# Patient Record
Sex: MALE | Race: WHITE | NOT HISPANIC OR LATINO | Employment: UNEMPLOYED | URBAN - METROPOLITAN AREA
[De-identification: names, ages, dates, MRNs, and addresses within clinical notes are randomized per-mention and may not be internally consistent; named-entity substitution may affect disease eponyms.]

---

## 2024-11-06 ENCOUNTER — TRANSCRIBE ORDERS (OUTPATIENT)
Dept: PHYSICAL THERAPY | Facility: CLINIC | Age: 19
End: 2024-11-06
Payer: OTHER GOVERNMENT

## 2024-11-06 DIAGNOSIS — S66.123D LACERATION OF FLEXOR MUSCLE, FASCIA AND TENDON OF LEFT MIDDLE FINGER AT WRIST AND HAND LEVEL, SUBSEQUENT ENCOUNTER: Primary | ICD-10-CM

## 2024-11-06 DIAGNOSIS — M25.642 STIFFNESS OF LEFT HAND JOINT: ICD-10-CM

## 2024-11-06 DIAGNOSIS — S64.493D INJURY OF DIGITAL NERVE OF LEFT MIDDLE FINGER, SUBSEQUENT ENCOUNTER: ICD-10-CM

## 2024-11-12 ENCOUNTER — TREATMENT (OUTPATIENT)
Dept: PHYSICAL THERAPY | Facility: CLINIC | Age: 19
End: 2024-11-12
Payer: OTHER GOVERNMENT

## 2024-11-12 DIAGNOSIS — S64.493D INJURY OF DIGITAL NERVE OF LEFT MIDDLE FINGER, SUBSEQUENT ENCOUNTER: ICD-10-CM

## 2024-11-12 DIAGNOSIS — M25.642 STIFFNESS OF FINGER JOINT OF LEFT HAND: ICD-10-CM

## 2024-11-12 DIAGNOSIS — S66.123D LACERATION OF FLEXOR MUSCLE, FASCIA AND TENDON OF LEFT MIDDLE FINGER AT WRIST AND HAND LEVEL, SUBSEQUENT ENCOUNTER: Primary | ICD-10-CM

## 2024-11-12 NOTE — PROGRESS NOTES
Outpatient Occupational Therapy Ortho Initial Evaluation                                 [address] [place] [address]    Patient: Teo Billings   : 2005  Diagnosis/ICD-10 Code:  Laceration of flexor muscle, fascia and tendon of left middle finger at wrist and hand level, subsequent encounter [S66.123D]  Referring practitioner: Jj Barber MD  Date of Initial Visit: 2024  Today's Date: 2024  Patient seen for 1 sessions               Subjective Questionnaire: QuickDASH:         Subjective Evaluation    History of Present Illness  Date of surgery: 10/27/2024  Mechanism of injury: Pt reports getting his L middle finger cut by circular saw on 10/26/24. Pt had surgery same night finishing next morning. Pt is in dorsal block splint with ace wrap from forearm to finger tips. Pt reports not really any pain at this point. Pt referred to Occupational therapy for evaluation and treatment for flexor tendon protocol.      Patient Occupation: student Quality of life: good    Pain  Current pain ratin  Location: L hand  Relieving factors: medications (PRN)    Social Support  Lives with: parents    Hand dominance: right    Treatments  Previous treatment: immobilization  Patient Goals  Patient goals for therapy: return to sport/leisure activities, increased motion and increased strength  Patient goal: Use his hand again, play video games and fishing         Objective          Observations     Additional Wrist/Hand Observation Details  Pt has healing incision from L volar third MCP to PIP with minimal drainage, healing incision on volar index finger distal phalanx with minimal drainage with no signs of infection.    Tenderness     Additional Tenderness Details  Tenderness to palpation index finger tip and middle finger proximal phalnax    Neurological Testing     Sensation     Wrist/Hand   Left   Diminished: light touch    Active Range of Motion     Additional Active Range of Motion  Details  immobilized  Left middle finger active extension to roof of dorsal blocks lacks 30 degrees at PIP joint    Passive Range of Motion     Additional Passive Range of Motion Details  Loose passive flexion of left middle finger with proximal phalanx swelling     Strength/Myotome Testing     Additional Strength Details  deferred    Swelling     Left Wrist/Hand     Additional Swelling Details  Moderate in middle finger                   Splinting:  Patient was measure and fit with a custom fabricated WHFO (dorsal blocking orthotic)   Patient was instructed in wearing schedule, precautions and care of the splint during this visit.   Patient was instructed in proper donning/doffing of splint.   Assessment:  Patient was fitted and appropriate splint was fabricated this date.  Patient reported that splint was comfortable and had no complications with the fit of the splint.  Patient was instructed and patient verbalized understanding of precautions, wear and care of the splint.   Patient demonstrated independent donning/doffing of splint during treatment today.  Goals:  Patient was fitted properly with appropriate splint for diagnosis  Patient was educated on precautions, wear schedule and care of splint  Patient demonstrated independence with donning/doffing of the splint.  Splint was provided to Protect Healing Structures, Restrict Mobility, Improve joint alignment.  Plan:  Patient advised to contact therapist with any additional questions or concerns regarding the fit and function of the splint.  Wear Instructions: full time       Assessment & Plan       Assessment  Impairments: abnormal coordination, abnormal or restricted ROM, activity intolerance, impaired physical strength and pain with function   Other impairment: unable fo put hair up, not tying shoes  Functional limitations: carrying objects, lifting, pulling and pushing   Prognosis: good    Goals  Plan Goals: 1. The patient has limited ROM of the L middle  finger                  LTG 1: 12 weeks:  The patient will demonstrate 240 degrees of COLIN L middle finger to allow the patient to weld and tie his shoes.                                  STATUS:  New                   STG 1a: 6 weeks:  The patient will demonstrate 150 COLIN degrees of L middle finger.                                  STATUS:  New                             2. The patient has limited strength of the L hand.                  LTG 2: 12 weeks:  The patient will demonstrate 60 lbs L  strength in order to open jars/bottles.                                  STATUS:  New                  STG 2a:10 weeks:  The patient will demonstrate good tolerance to  strength testing.                                  STATUS:  New  3. Carrying, Moving, and Handling Objects Functional Limitation                                    LTG 3: 12 weeks:  The patient will achieve a score of 11/55 on the Quick DASH.                                  STATUS:  New                  STG 3a: 6 weeks: The patient will achieve a score of 21/55 on the Quick DASH                                  STATUS: New  4. Positioning of L hand for tendon repair                      LTG4: 1 week: The patient will have good orthotic fitting to position hand in protection from flexor tendon repair.                                  STATUS: Met                  Plan  Planned modality interventions: TENS, thermotherapy (hydrocollator packs) and thermotherapy (paraffin bath)  Planned therapy interventions: manual therapy, functional ROM exercises, fine motor coordination training, flexibility, stretching, strengthening, home exercise program, neuromuscular re-education, soft tissue mobilization, therapeutic activities and orthotic fitting/training  Frequency: 2x week  Duration in weeks: 12  Treatment plan discussed with: patient          ICD-10-CM ICD-9-CM   1. Laceration of flexor muscle, fascia and tendon of left middle finger at wrist and hand level,  subsequent encounter  S66.123D 842.10   2. Injury of digital nerve of left middle finger, subsequent encounter  S64.493D V58.89     955.6   3. Stiffness of finger joint of left hand  M25.231 719.54       Patient is indicated for skilled occupational therapy services.    History # of Personal Factors and/or Comorbidities: LOW (0)  Examination of Body System(s): # of elements: MODERATE (3)  Clinical Presentation: EVOLVING  Clinical Decision Making: MODERATE     Evaluation:  Low Complexity:    0     mins  01422;  Mod Complexity:    30     mins  59855;  High Complexity:    0     mins  64103;      Untimed:  WHFO:    40     mins  L 3808      Timed Treatment:   0   mins   Total Treatment:     70   mins      OT SIGNATURE: REMA Haywood/JAYNE    Electronically signed   License number [id number]   DATE TREATMENT INITIATED: 11/12/2024    Initial Certification  Certification Period: 11/12/2024 thru 2/9/2025  I certify that the therapy services are furnished while this patient is under my care.  The services outlined above are required by this patient, and will be reviewed every 90 days.     PHYSICIAN: Jj Barber MD  NPI: [id number]                                          DATE:     Please sign and return via fax to  [phone]   Thank you, [facility].

## 2024-11-13 ENCOUNTER — TELEPHONE (OUTPATIENT)
Dept: PHYSICAL THERAPY | Facility: CLINIC | Age: 19
End: 2024-11-13
Payer: OTHER GOVERNMENT

## 2024-11-13 NOTE — TELEPHONE ENCOUNTER
Caller: Teo Billings    Relationship: Self    Best call back number: [phone]       What was the call regarding: WOULD LIKE TO ASK ABOUT THE CARE OF HIS HAND, WANTING TO KNOW ABOUT THE COLD COMING IN AND HOW TO KNOW IF HIS HAND IS OK AND HOW TO GET HEAT TO IT

## 2024-11-15 ENCOUNTER — TREATMENT (OUTPATIENT)
Dept: PHYSICAL THERAPY | Facility: CLINIC | Age: 19
End: 2024-11-15
Payer: OTHER GOVERNMENT

## 2024-11-15 DIAGNOSIS — S66.123D LACERATION OF FLEXOR MUSCLE, FASCIA AND TENDON OF LEFT MIDDLE FINGER AT WRIST AND HAND LEVEL, SUBSEQUENT ENCOUNTER: Primary | ICD-10-CM

## 2024-11-15 DIAGNOSIS — M25.642 STIFFNESS OF FINGER JOINT OF LEFT HAND: ICD-10-CM

## 2024-11-15 DIAGNOSIS — S64.493D INJURY OF DIGITAL NERVE OF LEFT MIDDLE FINGER, SUBSEQUENT ENCOUNTER: ICD-10-CM

## 2024-11-15 NOTE — PROGRESS NOTES
Occupational Therapy Daily Treatment Note  [address] [place] [address]      Patient: Teo Billings   : 2005  Referring practitioner: Jj Barber MD  Date of Initial Visit: Type: THERAPY  Noted: 2024  Today's Date: 11/15/2024  Patient seen for 2 sessions         Subjective   Teo Billings reports: his finger is feeling better.  Op reports shows he had repair to middle finger FDP and FDS, ulnar and radial digital nerves and arteries as well as A-2 pulley.    Objective   See Exercise, Manual, and Modality Logs for complete treatment.   No drainage on dressings or with PROM. Finger stockinette only used for coverage of both index and middle fingers today. Blue Open cell foam added to roof of his dorsal block splint today to increase IP extension. Pt tolerated treatment well.  Assessment/Plan    Visit Diagnoses:    ICD-10-CM ICD-9-CM   1. Laceration of flexor muscle, fascia and tendon of left middle finger at wrist and hand level, subsequent encounter  S66.123D 842.10   2. Injury of digital nerve of left middle finger, subsequent encounter  S64.493D V58.89     955.6   3. Stiffness of finger joint of left hand  M25.642 719.54       Continue per POC         Timed:  Manual Therapy:    13     mins  04575;  Therapeutic Exercise:    12     mins  93439;     Therapeutic Activity:    0     mins  28279;  Ultrasound:     0     mins  00449;    Electrical Stimulation:    0     mins 65797;  Neuromuscular Douglas:    0    mins  94539;      Timed Treatment:   25   mins   Total Treatment:     25   min    Nicolette Corral OTR/L  Occupational Therapist    Electronically signed   License number [id number]

## 2024-11-18 ENCOUNTER — TREATMENT (OUTPATIENT)
Dept: PHYSICAL THERAPY | Facility: CLINIC | Age: 19
End: 2024-11-18
Payer: OTHER GOVERNMENT

## 2024-11-18 DIAGNOSIS — M25.642 STIFFNESS OF FINGER JOINT OF LEFT HAND: ICD-10-CM

## 2024-11-18 DIAGNOSIS — S66.123D LACERATION OF FLEXOR MUSCLE, FASCIA AND TENDON OF LEFT MIDDLE FINGER AT WRIST AND HAND LEVEL, SUBSEQUENT ENCOUNTER: Primary | ICD-10-CM

## 2024-11-18 DIAGNOSIS — S64.493D INJURY OF DIGITAL NERVE OF LEFT MIDDLE FINGER, SUBSEQUENT ENCOUNTER: ICD-10-CM

## 2024-11-18 PROCEDURE — 97110 THERAPEUTIC EXERCISES: CPT | Performed by: PHYSICAL THERAPIST

## 2024-11-18 PROCEDURE — 97140 MANUAL THERAPY 1/> REGIONS: CPT | Performed by: PHYSICAL THERAPIST

## 2024-11-18 NOTE — PROGRESS NOTES
Occupational Therapy Daily Treatment Note  [address] [place], [place] [id number]      Patient: Teo Billings   : 2005  Referring practitioner: Jj Barber MD  Date of Initial Visit: Type: THERAPY  Noted: 2024  Today's Date: 2024  Patient seen for 3 sessions         Subjective   Teo Billings reports: he gets pins and needles if touched on radial side of proximal phalanx middle finger.    Objective   See Exercise, Manual, and Modality Logs for complete treatment.   Place and hold and scar massage added with good tolerance.  Place and hold PIP 60, DIP 30 degrees flexion, extension of PIP with MCP flexed -30 degrees.  Assessment/Plan    Visit Diagnoses:    ICD-10-CM ICD-9-CM   1. Laceration of flexor muscle, fascia and tendon of left middle finger at wrist and hand level, subsequent encounter  S66.123D 842.10   2. Injury of digital nerve of left middle finger, subsequent encounter  S64.493D V58.89     955.6   3. Stiffness of finger joint of left hand  M25.642 719.54       Continue per POC         Timed:  Manual Therapy:    20     mins  69522;  Therapeutic Exercise:    10     mins  24003;     Therapeutic Activity:    0     mins  86834;  Ultrasound:     0     mins  83707;    Electrical Stimulation:    0     mins 51189;  Neuromuscular Douglas:    0    mins  03963;      Timed Treatment:   30   mins   Total Treatment:     30   min    REMA Haywood/L  Occupational Therapist    Electronically signed   License number [id number]

## 2024-11-21 ENCOUNTER — TREATMENT (OUTPATIENT)
Dept: PHYSICAL THERAPY | Facility: CLINIC | Age: 19
End: 2024-11-21
Payer: OTHER GOVERNMENT

## 2024-11-21 DIAGNOSIS — S64.493D INJURY OF DIGITAL NERVE OF LEFT MIDDLE FINGER, SUBSEQUENT ENCOUNTER: ICD-10-CM

## 2024-11-21 DIAGNOSIS — M25.642 STIFFNESS OF FINGER JOINT OF LEFT HAND: ICD-10-CM

## 2024-11-21 DIAGNOSIS — S66.123D LACERATION OF FLEXOR MUSCLE, FASCIA AND TENDON OF LEFT MIDDLE FINGER AT WRIST AND HAND LEVEL, SUBSEQUENT ENCOUNTER: Primary | ICD-10-CM

## 2024-11-21 NOTE — PROGRESS NOTES
Occupational Therapy Daily Treatment Note  [address] [place] [address]      Patient: Teo Billings   : 2005  Referring practitioner: Jj Barber MD  Date of Initial Visit: Type: THERAPY  Noted: 2024  Today's Date: 2024  Patient seen for 4 sessions         Subjective   Teo Billings reports: motion is getting little better.     Objective   See Exercise, Manual, and Modality Logs for complete treatment.   Pt tolerated treatment well. Place and hold PIP at 60 degrees flexion and DIP at 30 degrees.  Active extension of PIP with MCP in flexion -30 degrees. Incisions healing well with no drainage. Wrist flexion and digital add/abduction added today with good tolerance.  Assessment/Plan    Visit Diagnoses:    ICD-10-CM ICD-9-CM   1. Laceration of flexor muscle, fascia and tendon of left middle finger at wrist and hand level, subsequent encounter  S66.123D 842.10   2. Injury of digital nerve of left middle finger, subsequent encounter  S64.493D V58.89     955.6   3. Stiffness of finger joint of left hand  M25.642 719.54       Continue per POC         Timed:  Manual Therapy:    8     mins  41656;  Therapeutic Exercise:    8     mins  94342;     Therapeutic Activity:    0     mins  88977;  Ultrasound:     0     mins  15953;    Electrical Stimulation:    0     mins 39507;  Neuromuscular Douglas:    8    mins  16888;      Timed Treatment:   24   mins   Total Treatment:     24   min    REMA Haywood/L  Occupational Therapist    Electronically signed   License number [id number]

## 2024-11-25 ENCOUNTER — TREATMENT (OUTPATIENT)
Dept: PHYSICAL THERAPY | Facility: CLINIC | Age: 19
End: 2024-11-25
Payer: OTHER GOVERNMENT

## 2024-11-25 DIAGNOSIS — S64.493D INJURY OF DIGITAL NERVE OF LEFT MIDDLE FINGER, SUBSEQUENT ENCOUNTER: ICD-10-CM

## 2024-11-25 DIAGNOSIS — S66.123D LACERATION OF FLEXOR MUSCLE, FASCIA AND TENDON OF LEFT MIDDLE FINGER AT WRIST AND HAND LEVEL, SUBSEQUENT ENCOUNTER: Primary | ICD-10-CM

## 2024-11-25 DIAGNOSIS — M25.642 STIFFNESS OF FINGER JOINT OF LEFT HAND: ICD-10-CM

## 2024-11-25 PROCEDURE — 97140 MANUAL THERAPY 1/> REGIONS: CPT | Performed by: PHYSICAL THERAPIST

## 2024-11-25 PROCEDURE — 97110 THERAPEUTIC EXERCISES: CPT | Performed by: PHYSICAL THERAPIST

## 2024-12-02 ENCOUNTER — TREATMENT (OUTPATIENT)
Dept: PHYSICAL THERAPY | Facility: CLINIC | Age: 19
End: 2024-12-02
Payer: OTHER GOVERNMENT

## 2024-12-02 DIAGNOSIS — M25.642 STIFFNESS OF FINGER JOINT OF LEFT HAND: ICD-10-CM

## 2024-12-02 DIAGNOSIS — S64.493D INJURY OF DIGITAL NERVE OF LEFT MIDDLE FINGER, SUBSEQUENT ENCOUNTER: ICD-10-CM

## 2024-12-02 DIAGNOSIS — S66.123D LACERATION OF FLEXOR MUSCLE, FASCIA AND TENDON OF LEFT MIDDLE FINGER AT WRIST AND HAND LEVEL, SUBSEQUENT ENCOUNTER: Primary | ICD-10-CM

## 2024-12-02 PROCEDURE — 97112 NEUROMUSCULAR REEDUCATION: CPT | Performed by: PHYSICAL THERAPIST

## 2024-12-02 PROCEDURE — 97110 THERAPEUTIC EXERCISES: CPT | Performed by: PHYSICAL THERAPIST

## 2024-12-02 NOTE — PROGRESS NOTES
Occupational Therapy Daily Treatment Note  [address] [place] [address]      Patient: Teo Billings   : 2005  Referring practitioner: Jj Barber MD  Date of Initial Visit: Type: THERAPY  Noted: 2024  Today's Date: 2024  Patient seen for 6 sessions         Subjective Evaluation    Pain  Current pain ratin  Location: L wrist         Teo Billings reports: no pain in the hand, just a little in the wrist.     Objective   See Exercise, Manual, and Modality Logs for complete treatment.   Place and hold PIP 60 degrees flexion, DIP 50 degrees  PIP flexion contracture 40 degrees.  All scabs off his middle finger, but still has large necrotic scab on pulp of left index finger tip. Silicone gel sleeve issued for night wear for middle finger.  Assessment/Plan    Visit Diagnoses:    ICD-10-CM ICD-9-CM   1. Laceration of flexor muscle, fascia and tendon of left middle finger at wrist and hand level, subsequent encounter  S66.123D 842.10   2. Injury of digital nerve of left middle finger, subsequent encounter  S64.493D V58.89     955.6   3. Stiffness of finger joint of left hand  M25.642 719.54       Continue per POC         Timed:  Manual Therapy:    8     mins  26335;  Therapeutic Exercise:    10     mins  40214;     Therapeutic Activity:    0     mins  57873;  Ultrasound:     0     mins  38617;    Electrical Stimulation:    0     mins 26500;  Neuromuscular Douglas:    10    mins  98604;      Timed Treatment:   28   mins   Total Treatment:     28   min    REMA Haywood/L  Occupational Therapist    Electronically signed   License number [id number]

## 2024-12-05 ENCOUNTER — TREATMENT (OUTPATIENT)
Dept: PHYSICAL THERAPY | Facility: CLINIC | Age: 19
End: 2024-12-05
Payer: OTHER GOVERNMENT

## 2024-12-05 DIAGNOSIS — M25.642 STIFFNESS OF FINGER JOINT OF LEFT HAND: ICD-10-CM

## 2024-12-05 DIAGNOSIS — S64.493D INJURY OF DIGITAL NERVE OF LEFT MIDDLE FINGER, SUBSEQUENT ENCOUNTER: ICD-10-CM

## 2024-12-05 DIAGNOSIS — S66.123D LACERATION OF FLEXOR MUSCLE, FASCIA AND TENDON OF LEFT MIDDLE FINGER AT WRIST AND HAND LEVEL, SUBSEQUENT ENCOUNTER: Primary | ICD-10-CM

## 2024-12-05 NOTE — PROGRESS NOTES
Occupational Therapy Daily Treatment Note  [address] [place] [address]      Patient: Teo Billings   : 2005  Referring practitioner: Jj Barber MD  Date of Initial Visit: Type: THERAPY  Noted: 2024  Today's Date: 2024  Patient seen for 7 sessions         Subjective   Teo Billings reports: I think the scar sleeve helped.    Objective   See Exercise, Manual, and Modality Logs for complete treatment.   Place and hold PIP flexion 70, DIP 45 degrees  Passive extension of PIP 25 degrees    Assessment/Plan    Visit Diagnoses:    ICD-10-CM ICD-9-CM   1. Laceration of flexor muscle, fascia and tendon of left middle finger at wrist and hand level, subsequent encounter  S66.123D 842.10   2. Injury of digital nerve of left middle finger, subsequent encounter  S64.493D V58.89     955.6   3. Stiffness of finger joint of left hand  M25.642 719.54       Continue per POC         Timed:  Manual Therapy:     8    mins  99450;  Therapeutic Exercise:    10     mins  96227;     Therapeutic Activity:    0     mins  50219;  Ultrasound:     0     mins  84324;    Electrical Stimulation:    0     mins 80647;  Neuromuscular Douglas:    10    mins  23100;      Timed Treatment:   28   mins   Total Treatment:     28   min    REMA Haywood/L  Occupational Therapist    Electronically signed   License number [id number]

## 2024-12-09 ENCOUNTER — TREATMENT (OUTPATIENT)
Dept: PHYSICAL THERAPY | Facility: CLINIC | Age: 19
End: 2024-12-09
Payer: OTHER GOVERNMENT

## 2024-12-09 DIAGNOSIS — M25.642 STIFFNESS OF FINGER JOINT OF LEFT HAND: ICD-10-CM

## 2024-12-09 DIAGNOSIS — S64.493D INJURY OF DIGITAL NERVE OF LEFT MIDDLE FINGER, SUBSEQUENT ENCOUNTER: ICD-10-CM

## 2024-12-09 DIAGNOSIS — S66.123D LACERATION OF FLEXOR MUSCLE, FASCIA AND TENDON OF LEFT MIDDLE FINGER AT WRIST AND HAND LEVEL, SUBSEQUENT ENCOUNTER: Primary | ICD-10-CM

## 2024-12-09 PROCEDURE — 97110 THERAPEUTIC EXERCISES: CPT | Performed by: PHYSICAL THERAPIST

## 2024-12-09 PROCEDURE — 97112 NEUROMUSCULAR REEDUCATION: CPT | Performed by: PHYSICAL THERAPIST

## 2024-12-09 NOTE — PROGRESS NOTES
Occupational Therapy Daily Treatment Note  [address] [place] [address]      Patient: Teo Billings   : 2005  Referring practitioner: Jj Barber MD  Date of Initial Visit: Type: THERAPY  Noted: 2024  Today's Date: 2024  Patient seen for 8 sessions         Subjective   Teo Billings reports: just a little pain in the wrist area.    Objective   See Exercise, Manual, and Modality Logs for complete treatment.   Pt still has thick necrotic scab on volar index distal phalanx. Tendon glides added today with good tolerance.     Assessment/Plan    Visit Diagnoses:    ICD-10-CM ICD-9-CM   1. Laceration of flexor muscle, fascia and tendon of left middle finger at wrist and hand level, subsequent encounter  S66.123D 842.10   2. Injury of digital nerve of left middle finger, subsequent encounter  S64.493D V58.89     955.6   3. Stiffness of finger joint of left hand  M25.642 719.54       Continue per POC         Timed:  Manual Therapy:    8     mins  99988;  Therapeutic Exercise:    10     mins  73525;     Therapeutic Activity:    8     mins  69233;  Ultrasound:     0     mins  80274;    Electrical Stimulation:    0     mins 84132;  Neuromuscular Douglas:    8    mins  01062;      Timed Treatment:   34   mins   Total Treatment:     34   min    REMA Haywood/L  Occupational Therapist    Electronically signed   License number [id number]

## 2024-12-16 ENCOUNTER — TREATMENT (OUTPATIENT)
Dept: PHYSICAL THERAPY | Facility: CLINIC | Age: 19
End: 2024-12-16
Payer: OTHER GOVERNMENT

## 2024-12-16 DIAGNOSIS — S66.123D LACERATION OF FLEXOR MUSCLE, FASCIA AND TENDON OF LEFT MIDDLE FINGER AT WRIST AND HAND LEVEL, SUBSEQUENT ENCOUNTER: Primary | ICD-10-CM

## 2024-12-16 DIAGNOSIS — S64.493D INJURY OF DIGITAL NERVE OF LEFT MIDDLE FINGER, SUBSEQUENT ENCOUNTER: ICD-10-CM

## 2024-12-16 PROCEDURE — 97112 NEUROMUSCULAR REEDUCATION: CPT | Performed by: PHYSICAL THERAPIST

## 2024-12-16 PROCEDURE — 97530 THERAPEUTIC ACTIVITIES: CPT | Performed by: PHYSICAL THERAPIST

## 2024-12-16 PROCEDURE — 97110 THERAPEUTIC EXERCISES: CPT | Performed by: PHYSICAL THERAPIST

## 2024-12-16 NOTE — LETTER
Progress Note  12/16/2024  Jj Barber MD    Re: Teo Billings  ________________________________________________________________    Mr. Teo Billings, has attended 9 OT sessions.  Treatment has consisted of: manual passive motion, scar care, range of motion of digits and wrist.     S: Mr. Teo Billings states: he does not really have any pain maybe 1/10.    O: Range of motion wrist WFL's              Left middle finger 0/80, -45/85, 0/65               COLIN 190  A: Pt very compliant and gained good finger flexion, but continues with PIP flexion contracture.    P: Continue 2x week for 6 more weeks. Should we add capener for PIP extension.    Thank you for this referral.        Nicolette Corral, OTR/L

## 2024-12-16 NOTE — PROGRESS NOTES
Progress Note  [address] [place] [address]      Patient: Teo Billings   : 2005  Referring practitioner: Jj Barber MD  Date of Initial Visit: Type: THERAPY  Noted: 2024  Today's Date: 2024  Patient seen for 9 sessions         Subjective Evaluation    Pain  Current pain ratin  At worst pain ratin  Location: L index finger         Teo Billings reports: it just gets a little sore after working it for a little bit.     Objective          Observations     Additional Wrist/Hand Observation Details  Pt has healing incision from L volar third MCP to PIP, healing incision on volar index finger distal phalanx.    Tenderness     Additional Tenderness Details  Tenderness to palpation index finger tip     Neurological Testing     Sensation     Wrist/Hand   Left   Diminished: light touch    Active Range of Motion     Left Digits    Flexion   Middle     MCP: 80 degrees    PIP: 85 degrees    DIP: 65 degrees  Extension   Middle     PIP: -40 degrees    Additional Active Range of Motion Details  immobilized  COLIN 190    Passive Range of Motion     Additional Passive Range of Motion Details  Loose passive flexion of left middle finger with proximal phalanx swelling     Strength/Myotome Testing     Additional Strength Details  deferred    Swelling     Left Wrist/Hand     Additional Swelling Details  Moderate in middle finger       See Exercise, Manual, and Modality Logs for complete treatment.       Assessment & Plan       Assessment  Impairments: abnormal coordination, abnormal or restricted ROM, activity intolerance, impaired physical strength and pain with function   Other impairment: unable fo put hair up, not tying shoes  Functional limitations: carrying objects, lifting, pulling and pushing   Prognosis: good    Goals  Plan Goals: 1. The patient has limited ROM of the L middle finger                  LTG 1: 12 weeks:  The patient will demonstrate 240 degrees of COLIN L middle finger  to allow the patient to weld and tie his shoes.                                  STATUS:  Not met                   STG 1a: 6 weeks:  The patient will demonstrate 150 COLIN degrees of L middle finger.                                  STATUS:  Met                             2. The patient has limited strength of the L hand.                  LTG 2: 12 weeks:  The patient will demonstrate 60 lbs L  strength in order to open jars/bottles.                                  STATUS:  New                  STG 2a:10 weeks:  The patient will demonstrate good tolerance to  strength testing.                                  STATUS:  New  3. Carrying, Moving, and Handling Objects Functional Limitation                                    LTG 3: 12 weeks:  The patient will achieve a score of 11/55 on the Quick DASH.                                  STATUS:  New                  STG 3a: 6 weeks: The patient will achieve a score of 21/55 on the Quick DASH                                  STATUS: New  4. Positioning of L hand for tendon repair                      LTG4: 1 week: The patient will have good orthotic fitting to position hand in protection from flexor tendon repair.                                  STATUS: Met                  Plan  Planned modality interventions: TENS, thermotherapy (hydrocollator packs) and thermotherapy (paraffin bath)  Planned therapy interventions: manual therapy, functional ROM exercises, fine motor coordination training, flexibility, stretching, strengthening, home exercise program, neuromuscular re-education, soft tissue mobilization, therapeutic activities and orthotic fitting/training  Frequency: 2x week  Duration in weeks: 8  Treatment plan discussed with: patient        Visit Diagnoses:    ICD-10-CM ICD-9-CM   1. Laceration of flexor muscle, fascia and tendon of left middle finger at wrist and hand level, subsequent encounter  S66.123D 842.10   2. Injury of digital nerve of left middle  finger, subsequent encounter  S64.493D V58.89     955.6                Timed:  Manual Therapy:    8     mins  94145;  Therapeutic Exercise:    10     mins  38744;     Therapeutic Activity:    10     mins  66169;  Ultrasound:     0     mins  76182;    Electrical Stimulation:    0     mins 70395;  Neuromuscular Douglas:    10    mins  76171;      Timed Treatment:   38   mins   Total Treatment:     38   min    Nicolette Corral OTR/L  Occupational Therapist    Electronically signed   License number [id number]

## 2025-01-09 ENCOUNTER — TREATMENT (OUTPATIENT)
Dept: PHYSICAL THERAPY | Facility: CLINIC | Age: 20
End: 2025-01-09
Payer: OTHER GOVERNMENT

## 2025-01-09 DIAGNOSIS — S66.123D LACERATION OF FLEXOR MUSCLE, FASCIA AND TENDON OF LEFT MIDDLE FINGER AT WRIST AND HAND LEVEL, SUBSEQUENT ENCOUNTER: Primary | ICD-10-CM

## 2025-01-09 DIAGNOSIS — S64.493D INJURY OF DIGITAL NERVE OF LEFT MIDDLE FINGER, SUBSEQUENT ENCOUNTER: ICD-10-CM

## 2025-01-09 NOTE — PROGRESS NOTES
Occupational Therapy Daily Treatment Note  [address] [place] [address]      Patient: Teo Billings   : 2005  Referring practitioner: Jj Barber MD  Date of Initial Visit: Type: THERAPY  Noted: 2024  Today's Date: 2025  Patient seen for 10 sessions         Subjective Evaluation    Pain  Current pain ratin  At worst pain ratin       Teo Billings reports: he felt a pop in his finger the day before he saw the surgeon. Suspects one of the tendons retore.     Objective          Observations     Additional Wrist/Hand Observation Details  Pt has healing incision from L volar third MCP to PIP, healing incision on volar index finger distal phalanx.    Tenderness     Additional Tenderness Details  Tenderness to palpation index finger tip     Neurological Testing     Sensation     Wrist/Hand   Left   Diminished: light touch    Active Range of Motion     Left Digits    Flexion   Middle     MCP: 85 degrees    PIP: 55 degrees    DIP: 35 degrees  Extension   Middle     PIP: -45 degrees    Additional Active Range of Motion Details  immobilized  COLIN 190    Strength/Myotome Testing     Additional Strength Details  deferred    Swelling     Left Wrist/Hand   Middle     Proximal: 7.6 cm    Middle: 6.7 cm    Right Wrist/Hand   Middle     Proximal: 7.6 cm    Middle: 6 cm      See Exercise, Manual, and Modality Logs for complete treatment.   Matt strap issued to patient to wear to aid in PIP flexion.     Assessment/Plan    Visit Diagnoses:    ICD-10-CM ICD-9-CM   1. Laceration of flexor muscle, fascia and tendon of left middle finger at wrist and hand level, subsequent encounter  S66.123D 842.10   2. Injury of digital nerve of left middle finger, subsequent encounter  S64.493D V58.89     955.6       Continue per POC         Timed:  Manual Therapy:    10     mins  32502;  Therapeutic Exercise:    10     mins  58837;     Therapeutic Activity:    8     mins  59169;  Ultrasound:     0     mins   56923;    Electrical Stimulation:    0     mins 01655;  Neuromuscular Douglas:    10    mins  54485;      Timed Treatment:   38   mins   Total Treatment:     38   min    Nicolette Corral OTR/L  Occupational Therapist    Electronically signed   License number [id number]

## 2025-01-16 ENCOUNTER — TREATMENT (OUTPATIENT)
Dept: PHYSICAL THERAPY | Facility: CLINIC | Age: 20
End: 2025-01-16
Payer: OTHER GOVERNMENT

## 2025-01-16 DIAGNOSIS — S66.123D LACERATION OF FLEXOR MUSCLE, FASCIA AND TENDON OF LEFT MIDDLE FINGER AT WRIST AND HAND LEVEL, SUBSEQUENT ENCOUNTER: Primary | ICD-10-CM

## 2025-01-16 DIAGNOSIS — S64.493D INJURY OF DIGITAL NERVE OF LEFT MIDDLE FINGER, SUBSEQUENT ENCOUNTER: ICD-10-CM

## 2025-01-16 NOTE — PROGRESS NOTES
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    Objective          Observations     Additional Wrist/Hand Observation Details  Pt has healing incision from L volar third MCP to PIP, healing incision on volar index finger distal phalanx.    Neurological Testing     Sensation     Wrist/Hand   Left   Diminished: light touch    Active Range of Motion     Left Wrist   Normal active range of motion    Left Digits    Flexion   Middle     MCP: 80 degrees    PIP: 60 degrees    DIP: 35 degrees  Extension   Middle     PIP: -40 degrees    Additional Active Range of Motion Details  COLIN 195    Strength/Myotome Testing     Additional Strength Details  deferred    Swelling     Left Wrist/Hand   Middle     Proximal: 7.9 cm    Middle: 6.8 cm    Right Wrist/Hand   Middle     Proximal: 7.6 cm    Middle: 6 cm      See Exercise, Manual, and Modality Logs for complete treatment.       Assessment & Plan       Assessment  Impairments: abnormal coordination, abnormal or restricted ROM, activity intolerance, impaired physical strength and pain with function   Other impairment: unable fo put hair up, not tying shoes as tight  Functional limitations: carrying objects, lifting, pulling and pushing   Prognosis: good    Goals  Plan Goals: 1. 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                                  STATUS:  Not met                    STG 1a: 6 weeks:  The patient will demonstrate 150 COLIN degrees of L middle finger.                                  STATUS:  Met                             2. The patient has limited strength of the L hand.                  LTG 2: 12 weeks:  The patient will demonstrate 60 lbs L  strength in order to open jars/bottles.                                  STATUS:  New                  STG 2a:10 weeks:  The patient will demonstrate good tolerance to  strength testing.                                  STATUS:  New  3. Carrying, Moving, and Handling Objects Functional Limitation                                    LTG 3: 12 weeks:  The patient will achieve a score of 11/55 on the Quick DASH.                                  STATUS:  New                  STG 3a: 6 weeks: The patient will achieve a score of 21/55 on the Quick DASH                                  STATUS: New  4. Positioning of L hand for tendon repair                      LTG4: 1 week: The patient will have good orthotic fitting to position hand in protection from flexor tendon repair.                                  STATUS: Met                  Plan  Planned modality interventions: TENS, thermotherapy (hydrocollator packs) and thermotherapy (paraffin bath)  Planned therapy interventions: manual therapy, functional ROM exercises, fine motor coordination training, flexibility, stretching, strengthening, home exercise program, neuromuscular re-education, soft tissue mobilization, therapeutic activities and orthotic fitting/training  Frequency: 2x week  Duration in weeks: 4  Treatment plan discussed with: patient        Visit Diagnoses:    ICD-10-CM ICD-9-CM   1. Laceration of flexor muscle, fascia and tendon of left middle finger at wrist and hand level, subsequent encounter  S66.123D 842.10   2. Injury of digital nerve of left middle finger, subsequent encounter  S64.493D V58.89     955.6                Timed:  Manual Therapy:    10     mins   97936;  Therapeutic Exercise:    8     mins  61300;     Therapeutic Activity:    8     mins  57112;  Ultrasound:     0     mins  01957;    Electrical Stimulation:    0     mins 15811;  Neuromuscular Douglas:    8    mins  44687;      Timed Treatment:   34   mins   Total Treatment:     34   min    Nicolette Corral OTR/L  Occupational Therapist    Electronically signed   License number [id number]

## 2025-01-21 ENCOUNTER — TREATMENT (OUTPATIENT)
Dept: PHYSICAL THERAPY | Facility: CLINIC | Age: 20
End: 2025-01-21
Payer: OTHER GOVERNMENT

## 2025-01-21 DIAGNOSIS — S66.123D LACERATION OF FLEXOR MUSCLE, FASCIA AND TENDON OF LEFT MIDDLE FINGER AT WRIST AND HAND LEVEL, SUBSEQUENT ENCOUNTER: Primary | ICD-10-CM

## 2025-01-21 DIAGNOSIS — S64.493D INJURY OF DIGITAL NERVE OF LEFT MIDDLE FINGER, SUBSEQUENT ENCOUNTER: ICD-10-CM

## 2025-01-21 PROCEDURE — 97110 THERAPEUTIC EXERCISES: CPT | Performed by: PHYSICAL THERAPIST

## 2025-01-21 PROCEDURE — 97112 NEUROMUSCULAR REEDUCATION: CPT | Performed by: PHYSICAL THERAPIST

## 2025-01-21 NOTE — PROGRESS NOTES
Occupational Therapy Daily Treatment Note  [address] [place] [address]      Patient: Teo Billings   : 2005  Referring practitioner: Jj Barber MD  Date of Initial Visit: Type: THERAPY  Noted: 2024  Today's Date: 2025  Patient seen for 12 sessions         Subjective   Teo Billings reports: just a little more tenderness in the finger than usual.    Objective   See Exercise, Manual, and Modality Logs for complete treatment.   L middle finger PIP -45/60  Pt has scar tissue thickening over proximal phalanx of middle finger with tenderness.  Assessment/Plan    Visit Diagnoses:    ICD-10-CM ICD-9-CM   1. Laceration of flexor muscle, fascia and tendon of left middle finger at wrist and hand level, subsequent encounter  S66.123D 842.10   2. Injury of digital nerve of left middle finger, subsequent encounter  S64.493D V58.89     955.6       Continue per POC         Timed:  Manual Therapy:    10     mins  61860;  Therapeutic Exercise:    10     mins  08790;     Therapeutic Activity:    0     mins  70380;  Ultrasound:     0     mins  87513;    Electrical Stimulation:    0     mins 00256;  Neuromuscular Douglas:    10    mins  49909;      Timed Treatment:   30   mins   Total Treatment:     30   min    REMA Haywood/L  Occupational Therapist    Electronically signed   License number [id number]

## 2025-04-07 ENCOUNTER — DOCUMENTATION (OUTPATIENT)
Dept: PHYSICAL THERAPY | Facility: CLINIC | Age: 20
End: 2025-04-07
Payer: OTHER GOVERNMENT

## 2025-04-07 NOTE — PROGRESS NOTES
Discharge Summary  Discharge Summary from Occupational Therapy Report    Patient Information  Teo Billings  2005    Dates OT visit: 11/12/24-1/21/25  Number of Visits: 12     Discharge Status of Patient: Pt had re ruptured his flexor tendon and was in school for welding.  Teo Gallagherws reports: just a little more tenderness in the finger than usual.     Objective   See Exercise, Manual, and Modality Logs for complete treatment.   L middle finger PIP -45/60  Pt has scar tissue thickening over proximal phalanx of middle finger with tenderness.    Goals: Partially Met    Visit Diagnoses:  No diagnosis found.    Discharge Plan: Patient to return to referring/providing physician    Comments Pt did not return for last scheduled appointment after MD appointment.    Date of Discharge 4/7/25        Nicolette Corral OTR/L  Occupational Therapist  License number [id number]

## 2025-06-27 ENCOUNTER — TRANSCRIBE ORDERS (OUTPATIENT)
Dept: PHYSICAL THERAPY | Facility: CLINIC | Age: 20
End: 2025-06-27
Payer: OTHER GOVERNMENT

## 2025-06-27 DIAGNOSIS — S66.123D LACERATION OF FLEXOR MUSCLE, FASCIA AND TENDON OF LEFT MIDDLE FINGER AT WRIST AND HAND LEVEL, SUBSEQUENT ENCOUNTER: Primary | ICD-10-CM

## 2025-07-14 ENCOUNTER — TREATMENT (OUTPATIENT)
Dept: PHYSICAL THERAPY | Facility: CLINIC | Age: 20
End: 2025-07-14
Payer: OTHER GOVERNMENT

## 2025-07-14 DIAGNOSIS — M25.642 FINGER STIFFNESS, LEFT: Primary | ICD-10-CM

## 2025-07-14 DIAGNOSIS — R29.898 LEFT HAND WEAKNESS: ICD-10-CM

## 2025-07-14 NOTE — PROGRESS NOTES
"Occupational Therapy Initial Evaluation and Plan of Care  [address] [facility] [address], [address]   [place]  [address]      Patient: Teo Billings   : 2005  Diagnosis/ICD-10 Code:  Finger stiffness, left [M25.642]  Referring practitioner: Jj Barber MD  Date of Initial Visit: 2025  Today's Date: 2025  Patient seen for 1 sessions               Subjective Evaluation    History of Present Illness  Date of onset: 10/20/2024  Date of surgery: 2025  Mechanism of injury: R hand dominant male.  Circular saw injury at home resulting in L middle finger fx with tendon and artery laceration.  Underwent surgery on 10/26 for repair of structures.  Received therapy until he reinjured in 2025.  He underwent a tenolysis on 25 and is referred back to therapy for evaluation and treatment.  His evaluation was delayed due to waiting on  referral.  He is now 5 1/2 weeks s/p 2nd surgery and is complaints include L hand stiffness and weakness.    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    Diagnostic Tests  X-ray: abnormal    Patient Goals  Patient goals for therapy: increased motion, increased strength, independence with ADLs/IADLs, return to sport/leisure activities and return to work  Patient goal: \"to gain back as much function as possible\"       Quick Dash 21/55 20-39% functional limitation    Objective          Observations     Additional Wrist/Hand Observation Details  Healed incisions on the L volar distal palm and middle finger    Tenderness     Additional Tenderness Details  None reported    Neurological Testing     Sensation     Wrist/Hand   Left "   Diminished: light touch    Additional Neurological Details  SW monafilament testing reveals 3.61 L middle finger radial and ulnar digital nerves. Indicating diminished light touch.  Remaining digits of L hand 2.83    Active Range of Motion     Left Wrist   Normal active range of motion    Additional Active Range of Motion Details  L Middle finger MP 0/90   PIP 0/85   DIP 0/65 COLIN 240  R Middle finger MP 0/90  PIP 0/100  DIP 0/80 COLIN 270      Passive Range of Motion     Additional Passive Range of Motion Details  Deferred until 10-12 weeks post op    Swelling     Left Wrist/Hand     Additional Swelling Details  Mild resolving in L middle finger          Assessment & Plan       Assessment  Impairments: abnormal coordination, abnormal muscle tone, abnormal or restricted ROM, activity intolerance, impaired physical strength, lacks appropriate home exercise program and pain with function   Functional limitations: carrying objects, lifting, sleeping, pulling, pushing, uncomfortable because of pain, reaching behind back, reaching overhead and unable to perform repetitive tasks   Prognosis: good    Goals  Plan Goals: L middle finger numbness and tingling  LTG 1  8 weeks:  Report 75% less numbness and tingling to improve ADL status  Status:  New  STG 1  4 weeks:   Report 25% less numbness and tingling  Status:  New    2.  Decrease AROM of the L middle finger  LTG 2  8 weeks:  Increase AROM of the finger/s to 270 degrees of total active motion to improve grasp  Status:  New  STG 2  4 weeks:  Increase AROM of the finger/s to 250 degrees of total active motion  Status:  New    3.  Decreased L hand strength  LTG 3  8 weeks:  Increase  strength to within norms for age to improve ability to grasp, lift, carry and handle objects  Status:  New  STG 3  4 weeks:  Good tolerance to strength testing  Status:  New    4.  Decreased functional use of the L hand  LTG 4  8 weeks:  Improved score to 11/55 or better on Quick  Dash  Status:  New  STG 4  4 weeks:  Improved score to 17/55 or better on Quick Dash  Status:  New         Plan  Planned modality interventions: iontophoresis, contrast bath immersion, cryotherapy, electrical stimulation/Russian stimulation, hydrotherapy, TENS, thermotherapy (hydrocollator packs), thermotherapy (paraffin bath) and ultrasound  Planned therapy interventions: ADL retraining, balance/weight-bearing training, body mechanics training, compression, fine motor coordination training, flexibility, manual therapy, neuromuscular re-education, motor coordination training, orthotic fitting/training, postural training, soft tissue mobilization, spinal/joint mobilization, strengthening, stretching, IADL retraining, home exercise program, functional ROM exercises and therapeutic activities  Frequency: 2x week  Duration in weeks: 8  Treatment plan discussed with: patient  Plan details: Discussed ROM exercises and cautioned against strenuous use and forceful grasp with the L hand.        Patient will be seen for skilled OT services    Visit Diagnoses:    ICD-10-CM ICD-9-CM   1. Finger stiffness, left  M25.642 719.54   2. Left hand weakness  R29.898 728.87       Timed:  Manual Therapy:                 0     mins  09957  Therapeutic Exercise:      15     mins  17698     Neuromuscular reeducation       0     mins 99298  Therapeutic Activity              0     mins  77547  Ultrasound:                  0     mins  22974     Untimed:  Low eval:                       45     mins  72153  Mod eval                        0     mins  22327  Electrical Stimulation:    0     mins  19081 ( );  Fuidotherapy     0     mins  35217      Timed Treatment:   15   mins   Total Treatment:     60  mins    OT SIGNATURE: Rosemary Lauren OT, CHT  Electronic Signature  [place] license #: [id number]      Initial Certification  Certification Period: 7/14/2025 thru 10/11/2025  I certify that the therapy services are furnished while this patient is under  my care.  The services outlined above are required by this patient, and will be reviewed every 90 days.     PHYSICIAN: Jj Barber MD   NPI: [id number]     DATE:       Please sign and return via fax to [phone].. Thank you, [facility].

## 2025-07-23 ENCOUNTER — TREATMENT (OUTPATIENT)
Dept: PHYSICAL THERAPY | Facility: CLINIC | Age: 20
End: 2025-07-23
Payer: OTHER GOVERNMENT

## 2025-07-23 DIAGNOSIS — R29.898 LEFT HAND WEAKNESS: ICD-10-CM

## 2025-07-23 DIAGNOSIS — M25.642 FINGER STIFFNESS, LEFT: Primary | ICD-10-CM

## 2025-07-23 PROCEDURE — 97110 THERAPEUTIC EXERCISES: CPT | Performed by: PHYSICAL THERAPIST

## 2025-07-23 PROCEDURE — 97112 NEUROMUSCULAR REEDUCATION: CPT | Performed by: PHYSICAL THERAPIST

## 2025-07-23 NOTE — PROGRESS NOTES
Occupational Therapy Daily Treatment Note  [address] [place] [address]      Patient: Teo Billings   : 2005  Referring practitioner: Jj Barber MD  Date of Initial Visit: Type: THERAPY  Noted: 2025  Today's Date: 2025  Patient seen for 2 sessions         Subjective   Teo Billings reports: a little pain today because the doctor released me and said I could do more forceful gripping now.    Objective          Observations     Additional Wrist/Hand Observation Details  Healed incisions on the L volar distal palm and middle finger    Tenderness     Additional Tenderness Details  None reported    Neurological Testing     Sensation     Wrist/Hand   Left   Diminished: light touch    Additional Neurological Details  SW monafilament testing reveals 3.61 L middle finger radial and ulnar digital nerves. Indicating diminished light touch.  Remaining digits of L hand 2.83    Active Range of Motion     Left Wrist   Normal active range of motion    Additional Active Range of Motion Details  L Middle finger MP 0/90   PIP 0/85   DIP 0/70 COLIN 245  R Middle finger MP 0/90  PIP 0/100  DIP 0/80 COLIN 270      Strength/Myotome Testing     Left Wrist/Hand      (2nd hand position)     Trial 1: 72 lbs    Trial 2: 72 lbs    Trial 3: 67 lbs    Average: 70.33 lbs    Right Wrist/Hand      (2nd hand position)     Trial 1: 132 lbs    Trial 2: 132 lbs    Trial 3: 130 lbs    Average: 131.33 lbs    Swelling     Left Wrist/Hand     Additional Swelling Details  Mild resolving in L middle finger      See Exercise, Manual, and Modality Logs for complete treatment.       Assessment/Plan    Visit Diagnoses:    ICD-10-CM ICD-9-CM   1. Finger stiffness, left  M25.642 719.54   2. Left hand weakness  R29.898 728.87       Continue per POC focusing on strengthening.         Timed:  Manual Therapy:    0     mins  42460;  Therapeutic Exercise:    10     mins  17876;     Therapeutic Activity:    10     mins   83615;  Ultrasound:     0     mins  29765;    Electrical Stimulation:    0     mins 26733;  Neuromuscular Douglas:    10    mins  46110;  Self Care Management:     0     mins  95451      Timed Treatment:   30   mins   Total Treatment:     30   min    Nicolette Corral OTR/L  Occupational Therapist    Electronically signed   License number [id number]

## 2025-07-28 ENCOUNTER — TREATMENT (OUTPATIENT)
Dept: PHYSICAL THERAPY | Facility: CLINIC | Age: 20
End: 2025-07-28
Payer: OTHER GOVERNMENT

## 2025-07-28 DIAGNOSIS — R29.898 LEFT HAND WEAKNESS: ICD-10-CM

## 2025-07-28 DIAGNOSIS — M25.642 FINGER STIFFNESS, LEFT: Primary | ICD-10-CM

## 2025-07-28 PROCEDURE — 97112 NEUROMUSCULAR REEDUCATION: CPT | Performed by: PHYSICAL THERAPIST

## 2025-07-28 PROCEDURE — 97110 THERAPEUTIC EXERCISES: CPT | Performed by: PHYSICAL THERAPIST

## 2025-07-28 NOTE — PROGRESS NOTES
Occupational Therapy Daily Treatment Note  [address] [place] [address]      Patient: Teo Billings   : 2005  Referring practitioner: Jj Barber MD  Date of Initial Visit: Type: THERAPY  Noted: 2025  Today's Date: 2025  Patient seen for 3 sessions         Subjective   Teo Billings reports: I am tired today, but the finger is doing good.     Objective          Observations     Additional Wrist/Hand Observation Details  Healed incisions on the L volar distal palm and middle finger    Tenderness     Additional Tenderness Details  None reported    Neurological Testing     Sensation     Wrist/Hand   Left   Diminished: light touch    Additional Neurological Details  SW monafilament testing reveals 3.61 L middle finger radial and ulnar digital nerves. Indicating diminished light touch.  Remaining digits of L hand 2.83    Active Range of Motion     Left Wrist   Normal active range of motion    Additional Active Range of Motion Details  L Middle finger MP 0/90   PIP 0/85   DIP 0/70 COLIN 245  R Middle finger MP 0/90  PIP 0/100  DIP 0/80 COLIN 270      Strength/Myotome Testing     Left Wrist/Hand      (2nd hand position)     Trial 1: 72 lbs    Trial 2: 72 lbs    Trial 3: 71 lbs    Average: 71.67 lbs    Right Wrist/Hand      (2nd hand position)     Trial 1: 132 lbs    Trial 2: 132 lbs    Trial 3: 130 lbs    Average: 131.33 lbs    Swelling     Left Wrist/Hand     Additional Swelling Details  Mild resolving in L middle finger      See Exercise, Manual, and Modality Logs for complete treatment.   Lifting from floor to waist added today with good tolerance 30 lbs for 10 reps. Pt required verbal cues for correct posture.    Assessment/Plan    Visit Diagnoses:    ICD-10-CM ICD-9-CM   1. Finger stiffness, left  M25.642 719.54   2. Left hand weakness  R29.898 728.87       Continue per POC         Timed:  Manual Therapy:    0     mins  07177;  Therapeutic Exercise:    10     mins  66565;      Therapeutic Activity:    10     mins  87491;  Ultrasound:     0     mins  88130;    Electrical Stimulation:    0     mins 62594;  Neuromuscular Douglas:    10    mins  51660;  Self Care Management:     0     mins  03131      Timed Treatment:   30   mins   Total Treatment:     30   min    Nicolette Corral OTR/L  Occupational Therapist    Electronically signed   License number [id number]

## 2025-07-30 ENCOUNTER — TREATMENT (OUTPATIENT)
Dept: PHYSICAL THERAPY | Facility: CLINIC | Age: 20
End: 2025-07-30
Payer: OTHER GOVERNMENT

## 2025-07-30 DIAGNOSIS — M25.642 FINGER STIFFNESS, LEFT: Primary | ICD-10-CM

## 2025-07-30 DIAGNOSIS — R29.898 LEFT HAND WEAKNESS: ICD-10-CM

## 2025-07-30 PROCEDURE — 97112 NEUROMUSCULAR REEDUCATION: CPT | Performed by: PHYSICAL THERAPIST

## 2025-07-30 PROCEDURE — 97110 THERAPEUTIC EXERCISES: CPT | Performed by: PHYSICAL THERAPIST

## 2025-07-30 NOTE — PROGRESS NOTES
Occupational Therapy Daily Treatment Note  [address] [place] [address]      Patient: Teo Billings   : 2005  Referring practitioner: Jj Barber MD  Date of Initial Visit: Type: THERAPY  Noted: 2025  Today's Date: 2025  Patient seen for 4 sessions         Subjective   Teo Billings reports: his finger is doing good.    Objective   See Exercise, Manual, and Modality Logs for complete treatment.   Pt tolerated treatment well for range of motion and strengthening his L hand.  Pinching added today with blue clothespin and some fatigue noted.  Assessment/Plan    Visit Diagnoses:    ICD-10-CM ICD-9-CM   1. Finger stiffness, left  M25.642 719.54   2. Left hand weakness  R29.898 728.87       Continue per POC         Timed:  Manual Therapy:    0     mins  54508;  Therapeutic Exercise:    8     mins  94241;     Therapeutic Activity:    8     mins  85670;  Ultrasound:     0     mins  26949;    Electrical Stimulation:    0     mins 67527;  Neuromuscular Douglas:    8    mins  18093;  Self Care Management:     0     mins  97934      Timed Treatment:   24   mins   Total Treatment:     24   min    Nicolette Corral OTR/L  Occupational Therapist    Electronically signed   License number [id number]

## 2025-08-04 ENCOUNTER — TREATMENT (OUTPATIENT)
Dept: PHYSICAL THERAPY | Facility: CLINIC | Age: 20
End: 2025-08-04
Payer: OTHER GOVERNMENT

## 2025-08-04 DIAGNOSIS — M25.642 FINGER STIFFNESS, LEFT: Primary | ICD-10-CM

## 2025-08-04 DIAGNOSIS — R29.898 LEFT HAND WEAKNESS: ICD-10-CM

## 2025-08-04 PROCEDURE — 97112 NEUROMUSCULAR REEDUCATION: CPT | Performed by: PHYSICAL THERAPIST

## 2025-08-04 PROCEDURE — 97110 THERAPEUTIC EXERCISES: CPT | Performed by: PHYSICAL THERAPIST

## 2025-08-06 ENCOUNTER — TREATMENT (OUTPATIENT)
Dept: PHYSICAL THERAPY | Facility: CLINIC | Age: 20
End: 2025-08-06
Payer: OTHER GOVERNMENT

## 2025-08-06 DIAGNOSIS — M25.642 FINGER STIFFNESS, LEFT: Primary | ICD-10-CM

## 2025-08-06 DIAGNOSIS — R29.898 LEFT HAND WEAKNESS: ICD-10-CM

## 2025-08-06 PROCEDURE — 97112 NEUROMUSCULAR REEDUCATION: CPT | Performed by: PHYSICAL THERAPIST

## 2025-08-06 PROCEDURE — 97110 THERAPEUTIC EXERCISES: CPT | Performed by: PHYSICAL THERAPIST

## 2025-08-11 ENCOUNTER — TREATMENT (OUTPATIENT)
Dept: PHYSICAL THERAPY | Facility: CLINIC | Age: 20
End: 2025-08-11
Payer: OTHER GOVERNMENT

## 2025-08-11 DIAGNOSIS — M25.642 FINGER STIFFNESS, LEFT: Primary | ICD-10-CM

## 2025-08-11 DIAGNOSIS — R29.898 LEFT HAND WEAKNESS: ICD-10-CM

## 2025-08-11 PROCEDURE — 97112 NEUROMUSCULAR REEDUCATION: CPT | Performed by: PHYSICAL THERAPIST

## 2025-08-11 PROCEDURE — 97110 THERAPEUTIC EXERCISES: CPT | Performed by: PHYSICAL THERAPIST

## 2025-08-13 ENCOUNTER — TREATMENT (OUTPATIENT)
Dept: PHYSICAL THERAPY | Facility: CLINIC | Age: 20
End: 2025-08-13
Payer: OTHER GOVERNMENT

## 2025-08-13 DIAGNOSIS — R29.898 LEFT HAND WEAKNESS: ICD-10-CM

## 2025-08-13 DIAGNOSIS — M25.642 FINGER STIFFNESS, LEFT: Primary | ICD-10-CM

## 2025-08-13 PROCEDURE — 97110 THERAPEUTIC EXERCISES: CPT | Performed by: PHYSICAL THERAPIST

## 2025-08-13 PROCEDURE — 97112 NEUROMUSCULAR REEDUCATION: CPT | Performed by: PHYSICAL THERAPIST
